# Patient Record
Sex: FEMALE | Race: WHITE | URBAN - METROPOLITAN AREA
[De-identification: names, ages, dates, MRNs, and addresses within clinical notes are randomized per-mention and may not be internally consistent; named-entity substitution may affect disease eponyms.]

---

## 2018-07-23 ENCOUNTER — TELEPHONE (OUTPATIENT)
Dept: FAMILY MEDICINE | Facility: CLINIC | Age: 28
End: 2018-07-23

## 2018-07-23 NOTE — TELEPHONE ENCOUNTER
7/23/2018    Call Regarding ReattributionPhysical    Attempt 1    Message on voicemail     Comments:           Outreach   AT